# Patient Record
Sex: MALE | HISPANIC OR LATINO | URBAN - METROPOLITAN AREA
[De-identification: names, ages, dates, MRNs, and addresses within clinical notes are randomized per-mention and may not be internally consistent; named-entity substitution may affect disease eponyms.]

---
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
## 2025-03-06 ENCOUNTER — OFFICE VISIT (OUTPATIENT)
Dept: URGENT CARE | Facility: CLINIC | Age: 20
End: 2025-03-06
Payer: COMMERCIAL

## 2025-03-06 VITALS
SYSTOLIC BLOOD PRESSURE: 112 MMHG | TEMPERATURE: 97.7 F | WEIGHT: 125 LBS | RESPIRATION RATE: 15 BRPM | OXYGEN SATURATION: 99 % | HEART RATE: 55 BPM | BODY MASS INDEX: 18.94 KG/M2 | HEIGHT: 68 IN | DIASTOLIC BLOOD PRESSURE: 74 MMHG

## 2025-03-06 DIAGNOSIS — S01.112A LACERATION OF LEFT EYEBROW, INITIAL ENCOUNTER: ICD-10-CM

## 2025-03-06 PROCEDURE — 12011 RPR F/E/E/N/L/M 2.5 CM/<: CPT | Performed by: PHYSICIAN ASSISTANT

## 2025-03-06 PROCEDURE — 3078F DIAST BP <80 MM HG: CPT | Performed by: PHYSICIAN ASSISTANT

## 2025-03-06 PROCEDURE — 3074F SYST BP LT 130 MM HG: CPT | Performed by: PHYSICIAN ASSISTANT

## 2025-03-06 NOTE — PROGRESS NOTES
"Subjective:   Ran Perez is a 19 y.o. male who presents for Facial Laceration (L brow laceration x2 days)      HPI  The patient presents to the Urgent Care accompanied by his mother with complaints of a laceration to his left eyebrow onset last night around 8 to 9 PM.  He was playing basketball when he excellently ran into another defender and butted heads.  There is no fall or reported loss of consciousness.  He denies any headache, nausea, vomiting, neck pain, dizziness, vision changes, numbness, tingling, weakness.  No pain with eye movement.  He did clean the laceration.  Some tenderness directly to the area with some swelling.  No other complaints.        No past medical history on file.  No Known Allergies     Objective:     /74   Pulse (!) 55   Temp 36.5 °C (97.7 °F) (Temporal)   Resp 15   Ht 1.727 m (5' 8\")   Wt 56.7 kg (125 lb)   SpO2 99%   BMI 19.01 kg/m²     Physical Exam  Vitals reviewed.   Constitutional:       General: He is not in acute distress.     Appearance: Normal appearance. He is not ill-appearing or toxic-appearing.   HENT:      Head:        Comments: Left lower eyebrow laterally there is an approximately 0.75 cm linear superficial laceration extending through epidermis.  No active bleeding.  No foreign body.  Minimal localized swelling and bruising.  Positive tenderness.  No bony tenderness, crepitus, step-offs, or deformity.  Eyes:      Extraocular Movements: Extraocular movements intact.      Conjunctiva/sclera: Conjunctivae normal.      Pupils: Pupils are equal, round, and reactive to light.   Cardiovascular:      Rate and Rhythm: Normal rate.   Pulmonary:      Effort: Pulmonary effort is normal.   Musculoskeletal:      Cervical back: Neck supple. No rigidity.   Skin:     General: Skin is warm and dry.   Neurological:      General: No focal deficit present.      Mental Status: He is alert and oriented to person, place, and time.   Psychiatric:         Mood and Affect: Mood " normal.         Behavior: Behavior normal.       Laceration Repair Procedure      Indication: Laceration    Location/Description: Left eyebrow     Procedure: The patient was placed in the appropriate position. The area was then cleansed using NS irrigation. The laceration was closed with Dermabond. There were no additional lacerations requiring repair.     Total repaired wound length: 0.75 cm.     Other Items: None    The patient tolerated the procedure well.    Complications: None    Diagnosis and associated orders:     1. Laceration of left eyebrow, initial encounter       Comments/MDM:     Laceration repair as above. No complications.   Watch for signs of infection.   AFTERCARE OF DERMABOND SKIN GLUE:  No external bandages are necessary over a wound closed by tissue adhesives.   The adhesive itself acts as a water-resistant bandage.   Antibiotic ointment should not be used as it can break down the adhesive prematurely.  Keep dry for 12 hours.   Patients may shower while the adhesive is on the skin, but should not soak or scrub the area for 7 to 10 days. Wet skin should be gently patted dry.  The adhesive will peel off when the epithelial layer sloughs off, usually by 5 to 10 days. Antibiotic ointment or petroleum jelly can be applied to the wound if the adhesive does not come off on its own.  No follow-up visit is required unless there are signs of infection or nonhealing.       I personally reviewed prior external notes and test results pertinent to today's visit. Pathogenesis of diagnosis discussed including typical length and natural progression. Supportive care, natural history, differential diagnoses, and indications for immediate follow-up discussed. Patient expresses understanding and agrees to plan. Patient denies any other questions or concerns.     Follow-up with the primary care physician for recheck, reevaluation, and consideration of further management.    Please note that this dictation was created  using voice recognition software. I have made a reasonable attempt to correct obvious errors, but I expect that there are errors of grammar and possibly content that I did not discover before finalizing the note.    This note was electronically signed by Mirza Weiss PA-C